# Patient Record
Sex: MALE | Race: WHITE
[De-identification: names, ages, dates, MRNs, and addresses within clinical notes are randomized per-mention and may not be internally consistent; named-entity substitution may affect disease eponyms.]

---

## 2017-07-03 NOTE — EKG REPORT
SEVERITY:- ABNORMAL ECG -

SINUS RHYTHM

PROBABLE LEFT VENTRICULAR HYPERTROPHY

:

Confirmed by: Hung Duran MD 03-Jul-2017 08:27:49

## 2017-07-03 NOTE — ER DOCUMENT REPORT
ED General





- General


Chief Complaint: Chest Pain


Stated Complaint: CHEST PAIN


Time Seen by Provider: 07/03/17 03:21


Notes: 


Patient is a 56-year-old male who comes emergency department for chief 

complaint of chest pain and irritation up in his throat, symptoms awoke him 

from sleep tonight.  He comes by ambulance, was given 324 mg of aspirin, 1 dose 

of sublingual iron, and a nitro glycerin patch.  Patient states the pain was in 

the left chest and radiated towards his left arm.  He states this is 

significantly better now but he still irritation in his throat like he needs to 

"throw up and get something out".  Patient ate steak for dinner tonight.  

Denies knowledge of esophageal stricture, states something similar happened 

with food several years ago though.  He does not smoke, he denies any cardiac 

medical history or family medical history of cardiac disease.  He denies any 

daily medications, only past medical history is chronic lower back pain. 








- Related Data


Allergies/Adverse Reactions: 


 





fentanyl Allergy (Verified 07/03/17 04:00)


 


narcotics Allergy (Uncoded 07/03/17 04:00)


 








Home Medications: 


 Current Home Medications





Ibuprofen/Famotidine [Duexis 800-26.6 mg Tablet] 1 tab PO TID 07/03/17 [History]











Past Medical History





- General


Information source: Patient





- Social History


Smoking Status: Never Smoker


Frequency of alcohol use: None


Drug Abuse: None


Lives with: Family


Family History: Reviewed & Not Pertinent


Musculoskeltal Medical History: Reports Hx Arthritis, Reports Hx 

Musculoskeletal Deformity, Reports Hx Musculoskeletal Trauma


Surgical Hx: Negative





- Immunizations


Immunizations up to date: Yes


Hx Diphtheria, Pertussis, Tetanus Vaccination: Yes





Review of Systems





- Review of Systems


Constitutional: No symptoms reported


EENT: No symptoms reported


Cardiovascular: See HPI


Respiratory: No symptoms reported


Gastrointestinal: See HPI


Genitourinary: No symptoms reported


Male Genitourinary: No symptoms reported


Musculoskeletal: No symptoms reported


Skin: No symptoms reported


Hematologic/Lymphatic: No symptoms reported


Neurological/Psychological: No symptoms reported





Physical Exam





- Vital signs


Vitals: 


 











Temp Pulse Ox


 


 98.2 F   98 


 


 07/03/17 03:08  07/03/17 03:08











Interpretation: Normal





- General


General appearance: Anxious - patient appears uncomfortable on exam, clearing 

his throat and making coughing sounds





- HEENT


Head: Normocephalic, Atraumatic


Eyes: Normal


Conjunctiva: Normal


Extraocular movements intact: Yes


Eyelashes: Normal


Pupils: PERRL


Nasal: Normal


Mouth/Lips: Normal


Mucous membranes: Normal


Pharynx: Normal


Neck: Normal





- Respiratory


Respiratory status: No respiratory distress


Chest status: Nontender


Breath sounds: Normal


Chest palpation: Normal





- Cardiovascular


Rhythm: Regular.  No: Tachycardia


Heart sounds: Normal auscultation, S1 appreciated, S2 appreciated


Murmur: No





- Abdominal


Inspection: Normal


Distension: No distension


Bowel sounds: Normal


Tenderness: Nontender.  No: Tender, Guarding


Organomegaly: No organomegaly





- Back


Back: Normal, Nontender





- Extremities


General upper extremity: Normal inspection, Nontender, Normal color, Normal ROM

, Normal temperature


General lower extremity: Normal inspection, Nontender, Normal color, Normal ROM

, Normal temperature, Normal weight bearing.  No: Ashia's sign





- Neurological


Neuro grossly intact: Yes


Cognition: Normal


Orientation: AAOx4


Runnells Coma Scale Eye Opening: Spontaneous


Nella Coma Scale Verbal: Oriented


Nella Coma Scale Motor: Obeys Commands


R[place] Coma Scale Total: 15


Speech: Normal


Motor strength normal: LUE, RUE, LLE, RLE


Sensory: Normal





- Psychological


Associated symptoms: Normal affect, Normal mood





- Skin


Skin Temperature: Warm


Skin Moisture: Dry


Skin Color: Normal





Course





- Re-evaluation


Re-evalutation: 


EKG showing sinus rhythm, no T-wave inversions or ST segment changes in 

consecutive leads.  Initial troponin negative.  CBC and chemistry generally 

unremarkable.





Patient stating he just feels like he needs to vomit and feel better, making 

cough sounds and clearing his throat.  After GI cocktail symptoms completely 

resolved.  Atypical pain and presentation with no concerning reported risk 

factors other than age.  Heart score of 1.  Low risk.





Unremarkable chest x-ray.  Troponin cycled and is negative.





Patient's description and presentation actually suggests esophageal spasm after 

likely food bolus difficulty.  On reexaminations patient reporting no symptoms, 

requesting to leave.  Providing with Zantac, discussed return precautions with 

return symptoms, discussed follow-up, patient states satisfaction and agreement.





- Vital Signs


Vital signs: 


 











Temp Pulse Resp BP Pulse Ox


 


 98.2 F      21 H  123/79   96 


 


 07/03/17 03:08     07/03/17 07:45  07/03/17 07:49  07/03/17 07:49














- Laboratory


Result Diagrams: 


 07/03/17 03:23





 07/03/17 03:23


Laboratory results interpreted by me: 


 











  07/03/17





  03:23


 


Chloride  110 H


 


Carbon Dioxide  21 L


 


Glucose  119 H














Discharge





- Discharge


Clinical Impression: 


Chest pain


Qualifiers:


 Chest pain type: unspecified Qualified Code(s): R07.9 - Chest pain, unspecified





Condition: Stable


Disposition: HOME, SELF-CARE


Additional Instructions: 


Your workup shows no concerning abnormalities. 


Your symptoms and response to treatment suggest a gastrointestinal source. 


Take the Zantac as prescribed (recommend take it daily for at least the next 

few days).


Follow up closely with Primary Care for additional evaluation and treatment. 


Return to the ED for any returned or new concerning symptoms. 





Prescriptions: 


Ranitidine HCl [Zantac 150 mg Tablet] 150 mg PO BID #30 tablet

## 2017-07-03 NOTE — RADIOLOGY REPORT (SQ)
EXAM DESCRIPTION:  CHEST SINGLE VIEW



COMPLETED DATE/TIME:  7/3/2017 4:18 am



REASON FOR STUDY:  chest pain



COMPARISON:  3/19/2010



EXAM PARAMETERS:  NUMBER OF VIEWS: One view.

TECHNIQUE: Single frontal radiographic view of the chest acquired.

RADIATION DOSE: NA

LIMITATIONS: None.



FINDINGS:  LUNGS AND PLEURA: No opacities, masses or pneumothorax. No pleural effusion.  Moderate cat
g volumes.

MEDIASTINUM AND HILAR STRUCTURES: No masses.  Contour normal.

HEART AND VASCULAR STRUCTURES: Heart normal in size.  Normal vasculature.

BONES: No acute findings.

HARDWARE: None in the chest.

OTHER: No other significant finding.



IMPRESSION:  NO ACUTE RADIOGRAPHIC FINDING IN THE CHEST.



TECHNICAL DOCUMENTATION:  JOB ID:  [id number]

## 2018-02-23 ENCOUNTER — HOSPITAL ENCOUNTER (OUTPATIENT)
Dept: HOSPITAL 62 - OD | Age: 58
End: 2018-02-23
Attending: INTERNAL MEDICINE
Payer: COMMERCIAL

## 2018-02-23 DIAGNOSIS — E78.00: Primary | ICD-10-CM

## 2018-02-23 DIAGNOSIS — Z79.899: ICD-10-CM

## 2018-02-23 DIAGNOSIS — E66.01: ICD-10-CM

## 2018-02-23 DIAGNOSIS — I10: ICD-10-CM

## 2018-02-23 LAB
ALBUMIN SERPL-MCNC: 4.4 G/DL (ref 3.5–5)
ALP SERPL-CCNC: 67 U/L (ref 38–126)
ALT SERPL-CCNC: 40 U/L (ref 21–72)
ANION GAP SERPL CALC-SCNC: 11 MMOL/L (ref 5–19)
AST SERPL-CCNC: 27 U/L (ref 17–59)
BILIRUB DIRECT SERPL-MCNC: 0.4 MG/DL (ref 0–0.4)
BILIRUB SERPL-MCNC: 0.7 MG/DL (ref 0.2–1.3)
BUN SERPL-MCNC: 16 MG/DL (ref 7–20)
CALCIUM: 9.6 MG/DL (ref 8.4–10.2)
CHLORIDE SERPL-SCNC: 106 MMOL/L (ref 98–107)
CHOLEST SERPL-MCNC: 205.67 MG/DL (ref 0–200)
CO2 SERPL-SCNC: 28 MMOL/L (ref 22–30)
FREE T4 (FREE THYROXINE): 1.09 NG/DL (ref 0.78–2.19)
GAMMA-GLUTAMYL TRANSFERASE: 43 U/L (ref 8–78)
GLUCOSE SERPL-MCNC: 100 MG/DL (ref 75–110)
LDLC SERPL DIRECT ASSAY-MCNC: 154 MG/DL (ref ?–100)
POTASSIUM SERPL-SCNC: 4.9 MMOL/L (ref 3.6–5)
PROT SERPL-MCNC: 7.6 G/DL (ref 6.3–8.2)
SODIUM SERPL-SCNC: 145.3 MMOL/L (ref 137–145)
TRIGL SERPL-MCNC: 94 MG/DL (ref ?–150)
TSH SERPL-ACNC: 0.87 UIU/ML (ref 0.47–4.68)
VLDLC SERPL CALC-MCNC: 19 MG/DL (ref 10–31)

## 2018-02-23 PROCEDURE — 83036 HEMOGLOBIN GLYCOSYLATED A1C: CPT

## 2018-02-23 PROCEDURE — 83735 ASSAY OF MAGNESIUM: CPT

## 2018-02-23 PROCEDURE — 80048 BASIC METABOLIC PNL TOTAL CA: CPT

## 2018-02-23 PROCEDURE — 36415 COLL VENOUS BLD VENIPUNCTURE: CPT

## 2018-02-23 PROCEDURE — 80061 LIPID PANEL: CPT

## 2018-02-23 PROCEDURE — 84439 ASSAY OF FREE THYROXINE: CPT

## 2018-02-23 PROCEDURE — 80076 HEPATIC FUNCTION PANEL: CPT

## 2018-02-23 PROCEDURE — 84481 FREE ASSAY (FT-3): CPT

## 2018-02-23 PROCEDURE — 84443 ASSAY THYROID STIM HORMONE: CPT

## 2018-02-23 PROCEDURE — 82977 ASSAY OF GGT: CPT
